# Patient Record
Sex: FEMALE | Race: BLACK OR AFRICAN AMERICAN | HISPANIC OR LATINO | URBAN - METROPOLITAN AREA
[De-identification: names, ages, dates, MRNs, and addresses within clinical notes are randomized per-mention and may not be internally consistent; named-entity substitution may affect disease eponyms.]

---

## 2022-09-29 ENCOUNTER — EMERGENCY (EMERGENCY)
Facility: HOSPITAL | Age: 47
LOS: 1 days | Discharge: HOME | End: 2022-09-29
Attending: EMERGENCY MEDICINE
Payer: MEDICAID

## 2022-09-29 VITALS
OXYGEN SATURATION: 98 % | WEIGHT: 130.07 LBS | TEMPERATURE: 98 F | SYSTOLIC BLOOD PRESSURE: 192 MMHG | HEART RATE: 87 BPM | DIASTOLIC BLOOD PRESSURE: 105 MMHG | RESPIRATION RATE: 20 BRPM

## 2022-09-29 VITALS — SYSTOLIC BLOOD PRESSURE: 180 MMHG | DIASTOLIC BLOOD PRESSURE: 116 MMHG

## 2022-09-29 PROCEDURE — 99283 EMERGENCY DEPT VISIT LOW MDM: CPT

## 2022-09-29 RX ORDER — FLUCONAZOLE 150 MG/1
150 TABLET ORAL ONCE
Refills: 0 | Status: COMPLETED | OUTPATIENT
Start: 2022-09-29 | End: 2022-09-29

## 2022-09-29 RX ADMIN — FLUCONAZOLE 150 MILLIGRAM(S): 150 TABLET ORAL at 23:54

## 2022-09-29 NOTE — ED PROVIDER NOTE - PATIENT PORTAL LINK FT
You can access the FollowMyHealth Patient Portal offered by [facility] by registering at the following website: [email or web address]. By joining [facility]’s FollowMyHealth portal, you will also be able to view your health information using other applications (apps) compatible with our system.

## 2022-09-29 NOTE — ED ADULT TRIAGE NOTE - CHIEF COMPLAINT QUOTE
Pt reporting vaginal pain/discharge, dysuria and back pain worsening x2 weeks. Pt now reports dizziness, tactile fevers and nausea, denies vomiting. Pt PMH HTN, does not take prescribed medication, denies chest pain/SOB. Pt with no apparent acute distress observed at this time.

## 2022-09-29 NOTE — ED PROVIDER NOTE - PHYSICAL EXAMINATION
Gen: Well appearing in NAD  Head: NC/AT  Neck: trachea midline  Cardiac: RRR  Resp:  No distress; CTAB  Abd: Soft, NT, no CVAT  : Thick white discharge, no tenderness (Chaperone Katya White, Scribe)  Ext: no deformities  Neuro:  A&O appears non focal  Skin:  Warm and dry as visualized  Psych:  Normal affect and mood Gen: Well appearing in NAD  Head: NC/AT  Neck: trachea midline  Cardiac: RRR  Resp:  No distress; CTAB  Abd: Soft, NT, no CVAT  : Thick white cervical discharge, no tenderness (Chaperone Katya White, Scribe)  Ext: no deformities  Neuro:  A&O appears non focal  Skin:  Warm and dry as visualized  Psych:  Normal affect and mood

## 2022-09-29 NOTE — ED PROVIDER NOTE - NSFOLLOWUPINSTRUCTIONS_ED_ALL_ED_FT
- Follow up with your doctor within 2-3 days.   - Bring results with you to the appointment.   - Take Amlodipine once per day.   - Return to the ED for any new or worsening symptoms.         LO QUE NECESITA SABER:    ¿Qué es la candidiasis?La infección por hongos, o candidiasis vaginal, chery infección vaginal común. La infección por hongos es causada por un hongo o microorganismo levaduriforme. Los hongos se encuentran normalmente en la vagina. Muchos hongos pueden causar chery infección.    ¿Qué aumenta mi riesgo de candidiasis?  •Embarazo      •Los medicamentos, jim los antibióticos, píldoras anticonceptivas o medicamentos con esteroides      •Condiciones médicas, jim la diabetes      •Dispositivos anticonceptivos, jim los diafragmas, las esponjas y los dispositivos intrauterinos      ¿Cuáles son los signos y síntomas de la candidiasis?  •Flujo espeso, olsen y con apariencia de queso que proviene de la vagina      •Picazón, inflamación o enrojecimiento en la vagina      •Dolor o ardor al orinar      •Dolor woody las relaciones sexuales      ¿Cómo se diagnostica y trata la candidiasis?  •Clark médico le preguntará acerca de clark historial médico y lo examinará. Chery muestra del flujo vaginal puede mostrar que levadura le está causando la infección.      •Los medicamentos ayudan a tratar la infección por el hongo y a disminuir la inflamación. El medicamento puede venir en presentación de píldora, crema, ungüento, comprimido o supositorio para la vagina. Con tratamiento, la infección usualmente desaparece en chery semana.      ¿Qué puedo hacer para mantener la kali de mi vagina?  •Limpie alrededor del área genital con agua tibia y un jabón suave todos los días.No coloque jabón dentro de la vagina. Después de lavada, séquela suavemente. No use jacuzzis. El calor y la humedad de los jacuzzis pueden aumentar el riesgo de otra candidiasis.      •Límpiese siempre de adelante hacia atrásdespués de usar el inodoro. [place] kezia la diseminación de bacterias desde el área rectal hacia la vagina.      •No use ropa ni lencería apretadadurante largos períodos. Que use ropa interior de algodón woody el día. El algodón ayuda a mantener el área genital seca y no mantiene el calor o la humedad. No use ninguna ropa interior por las noches.      •No tome duchas vaginalesni use aerosoles de higiene femenina o annette de burbujas. No utilice almohadillas o tampones que son perfumados o de colores ni papel higiénico perfumado.      •No tenga relaciones sexuales hasta que shelby síntomas hayan desaparecido.Radhames que clark valeriano use un preservativo hasta que usted complete el tratamiento.      •Consulte con clark médico acerca de las opciones de anticonceptivos, si es necesario.Los condones de látex y los diafragmas tienen un gel que enriquez los espermatozoides. Ambos pueden irritar el área genital.      ¿Cuándo anahi llamar a mi médico o ginecólogo?  •Usted tiene fiebre y escalofríos.      •Usted desarrolla un dolor abdominal o pélvico.      •La secreción contiene mitchel y no es de la menstruación.      •Shelby signos y síntomas empeoran, incluso después del tratamiento.      •Usted tiene preguntas o inquietudes acerca de clark condición o cuidado.      ACUERDOS SOBRE CLARK CUIDADO:    Usted tiene el derecho de ayudar a planear clark cuidado. Aprenda todo lo que pueda sobre clark condición y jim darle tratamiento. Discuta shelby opciones de tratamiento con shelby médicos para decidir el cuidado que usted desea recibir. Usted siempre tiene el derecho de rechazar el tratamiento.

## 2022-09-30 LAB
APPEARANCE UR: CLEAR — SIGNIFICANT CHANGE UP
BACTERIA # UR AUTO: ABNORMAL
BILIRUB UR-MCNC: NEGATIVE — SIGNIFICANT CHANGE UP
COLOR SPEC: YELLOW — SIGNIFICANT CHANGE UP
DIFF PNL FLD: ABNORMAL
EPI CELLS # UR: SIGNIFICANT CHANGE UP
GLUCOSE UR QL: NEGATIVE MG/DL — SIGNIFICANT CHANGE UP
KETONES UR-MCNC: NEGATIVE — SIGNIFICANT CHANGE UP
LEUKOCYTE ESTERASE UR-ACNC: NEGATIVE — SIGNIFICANT CHANGE UP
NITRITE UR-MCNC: NEGATIVE — SIGNIFICANT CHANGE UP
PH UR: 7 — SIGNIFICANT CHANGE UP (ref 5–8)
PROT UR-MCNC: 15
RBC CASTS # UR COMP ASSIST: SIGNIFICANT CHANGE UP /HPF (ref 0–4)
SP GR SPEC: 1 — LOW (ref 1.01–1.02)
UROBILINOGEN FLD QL: NEGATIVE MG/DL — SIGNIFICANT CHANGE UP
WBC UR QL: SIGNIFICANT CHANGE UP /HPF (ref 0–5)

## 2022-09-30 PROCEDURE — 81001 URINALYSIS AUTO W/SCOPE: CPT

## 2022-09-30 PROCEDURE — 87086 URINE CULTURE/COLONY COUNT: CPT

## 2022-09-30 PROCEDURE — 99283 EMERGENCY DEPT VISIT LOW MDM: CPT

## 2022-09-30 RX ORDER — AMLODIPINE BESYLATE 2.5 MG/1
5 TABLET ORAL ONCE
Refills: 0 | Status: COMPLETED | OUTPATIENT
Start: 2022-09-30 | End: 2022-09-30

## 2022-09-30 RX ORDER — AMLODIPINE BESYLATE 2.5 MG/1
1 TABLET ORAL
Qty: 14 | Refills: 0
Start: 2022-09-30 | End: 2022-10-13

## 2022-09-30 RX ADMIN — AMLODIPINE BESYLATE 5 MILLIGRAM(S): 2.5 TABLET ORAL at 00:38

## 2022-10-01 LAB
CULTURE RESULTS: SIGNIFICANT CHANGE UP
SPECIMEN SOURCE: SIGNIFICANT CHANGE UP

## 2022-11-12 ENCOUNTER — EMERGENCY (EMERGENCY)
Facility: HOSPITAL | Age: 47
LOS: 1 days | Discharge: HOME | End: 2022-11-12
Attending: EMERGENCY MEDICINE
Payer: MEDICAID

## 2022-11-12 VITALS
SYSTOLIC BLOOD PRESSURE: 162 MMHG | HEART RATE: 81 BPM | TEMPERATURE: 98 F | OXYGEN SATURATION: 98 % | WEIGHT: 149.91 LBS | DIASTOLIC BLOOD PRESSURE: 105 MMHG | RESPIRATION RATE: 16 BRPM

## 2022-11-12 VITALS
DIASTOLIC BLOOD PRESSURE: 100 MMHG | HEART RATE: 69 BPM | RESPIRATION RATE: 17 BRPM | SYSTOLIC BLOOD PRESSURE: 163 MMHG | OXYGEN SATURATION: 98 % | TEMPERATURE: 98 F

## 2022-11-12 PROCEDURE — 99283 EMERGENCY DEPT VISIT LOW MDM: CPT

## 2022-11-12 RX ORDER — IBUPROFEN 200 MG
600 TABLET ORAL ONCE
Refills: 0 | Status: COMPLETED | OUTPATIENT
Start: 2022-11-12 | End: 2022-11-12

## 2022-11-12 RX ORDER — ACETAMINOPHEN 500 MG
650 TABLET ORAL ONCE
Refills: 0 | Status: COMPLETED | OUTPATIENT
Start: 2022-11-12 | End: 2022-11-12

## 2022-11-12 RX ADMIN — Medication 650 MILLIGRAM(S): at 02:58

## 2022-11-12 RX ADMIN — Medication 600 MILLIGRAM(S): at 02:58

## 2022-11-12 NOTE — ED ADULT TRIAGE NOTE - CHIEF COMPLAINT QUOTE
Ambulatory to ED c/o headache and bilateral lower extremity swelling x3 days. HX HTN, states takes unknown medication daily for pressure control. At triage, GCS 15, no neuro deficits appreciated.

## 2022-11-12 NOTE — ED PROVIDER NOTE - OBJECTIVE STATEMENT
48 y/o female with PMHx of HTN presents to ED c/o intermittent HA for a couple of days, exact amount unknown. Pt did not take any pain medication. Denies n/v, visual changes. Pt also c/o bilateral foot pain and swelling for 3-4 days. Pt cannot recall which medicine she takes for HTN, but reports dose is 5mg. LMP-last month, states there is no chance of pregnancy due to tubal ligation.  : [id number]

## 2022-11-12 NOTE — ED PROVIDER NOTE - PATIENT PORTAL LINK FT
[FreeTextEntry2] : mild fatigue but still working a .  [de-identified] : Neuropathy UE You can access the FollowMyHealth Patient Portal offered by [facility] by registering at the following website: [email or web address]. By joining [facility]’s FollowMyHealth portal, you will also be able to view your health information using other applications (apps) compatible with our system.

## 2022-11-12 NOTE — ED PROVIDER NOTE - NSFOLLOWUPINSTRUCTIONS_ED_ALL_ED_FT
Cefalea tensional en los adultos    Tension Headache, Adult      La cefalea tensional es chery sensación de dolor o presión en la frente y los lados de la maddy. El dolor puede ser sordo o puede sentirse jim chery tensión. Hay dos tipos de cefaleas tensionales:  •Cefalea tensional episódica. Es cuando las cefaleas se producen menos de 15 días por mes.      •Cefalea tensional crónica. Es cuando las cefaleas se producen más de 15 días por mes en un período de 3 meses.      Las cefaleas tensionales pueden durar de 30 minutos a varios días. Constituyen el tipo más frecuente de dolor de maddy. Generalmente, no se asocian con náuseas o vómitos y no empeoran con la actividad física.      ¿Cuáles son las causas?    Se desconoce la causa exacta de esta afección. Las cefaleas tensionales generalmente aparecen después de chery situación de estrés, ansiedad o por depresión. Otros factores desencadenantes pueden incluir los siguientes:  •Alcohol.      •Exceso de cafeína o abstinencia de cafeína.      •Infecciones respiratorias, jim resfriados, gripes o sinusitis.      •Problemas dentales o apretar los dientes.      •Fatiga.      •Mantener la maddy y el lana en la misma posición woody un período prolongado, por ejemplo, al usar la computadora.      •Fumar.      •Artritis del lana.        ¿Cuáles son los signos o los síntomas?    Los síntomas de esta afección incluyen:  •Sensación de presión o tensión alrededor de la maddy.      •Dolor sordo en la maddy.      •Dolor sobre la frente y los lados de la maddy.      •Dolor a la palpación en los músculos de la maddy, del lana y de los hombros.        ¿Cómo se diagnostica?    Esta afección se puede diagnosticar en función de los síntomas, la historia clínica y los antecedentes médicos.    Si los síntomas son agudos o inusuales, es posible que le wilmer estudios de diagnóstico por imágenes, jim chery exploración por tomografía computarizada (TC) o chery resonancia magnética (RM) de la maddy. También le pueden revisar la vista.      ¿Cómo se trata?    Esta afección puede tratarse con cambios en el estilo de taylor y medicamentos que ayudan a aliviar los síntomas.      Siga estas instrucciones en douglass casa:    Control del dolor     •Use los medicamentos de venta charles y los recetados solamente jim se lo haya indicado el médico.      •Cuando tenga chery cefalea, acuéstese en chery habitación oscura y silenciosa.    •Si se lo indican, aplíquese hielo en la maddy y en el lana. Para hacer esto:  •Ponga el hielo en chery bolsa plástica.      •Coloque chery toalla entre la piel y la bolsa.      •Aplique el hielo woody 20 minutos, 2 o 3 veces por día.      •Retire el hielo si la piel se pone de color mcrae brillante. Lexa es muy importante. Si no puede sentir dolor, calor o frío, tiene un mayor riesgo de que se dañe la renata.      •Si se lo indican, aplique calor en la renata posterior del lana con la frecuencia que le haya indicado el médico. Use la mauri de calor que el médico le recomiende, jim chery compresa de calor húmedo o chery almohadilla térmica.  •Coloque chery toalla entre la piel y la mauri de calor.      •Aplique calor woody 20 a 30 minutos.      •Retire la mauri de calor si la piel se pone de color mcrae brillante. Lexa es especialmente importante si no puede sentir dolor, calor o frío. Corre un mayor riesgo de sufrir quemaduras.        Comida y bebida     •Mantenga un horario para las comidas.    •Si david alcohol:•Limite la cantidad que david a lo siguiente:  •De 0 a 1 medida por día para las mujeres que no están embarazadas.      •De 0 a 2 medidas por día para los hombres.        •Sepa cuánta cantidad de alcohol hay en las bebidas que prudence. En los [place], chery medida equivale a chery botella de cerveza de 12 oz (355 ml), un vaso de vino de 5 oz (148 ml) o un vaso de chery bebida alcohólica de brian graduación de 1½ oz (44 ml).        •Leeanna suficiente líquido jim para mantener la orina de color amarillo pálido.      •Disminuya el consumo de cafeína o deje de consumir cafeína.      Estilo de taylor     •Duerma entre 7 y 9 horas o la cantidad de horas que le haya recomendado el médico.      •A la hora de acostarse, retire las computadoras, los teléfonos y las tabletas del dormitorio.    •Busque maneras de manejar el estrés. Lexa puede incluir:  •Realizar actividad física.      •Practicar ejercicios de respiración profunda.      •Yoga.      •Escuchar música.      •Visualización mental positiva.        •Trate de sentarse derecho y evite tensionar los músculos.      • No consuma ningún producto que contenga nicotina o tabaco. Estos incluyen cigarrillos, tabaco para mascar y aparatos de vapeo, jim los cigarrillos electrónicos. Si necesita ayuda para dejar de fumar, consulte al médico.        Instrucciones generales    •Evite cualquier desencadenante del dolor de maddy. Lleve un registro diario para averiguar qué puede desencadenar las cefaleas. Registre, por ejemplo, lo siguiente:  •Lo que usted come y david.      •El tiempo que duerme.      •Algún cambio en douglass dieta o en los medicamentos.        •Cumpla con todas las visitas de seguimiento. Lexa es importante.        Comuníquese con un médico si:    •La cefalea no se basim.      •La cefalea regresa.      •Tiene sensibilidad a los sonidos, la dipak o los olores debido a la cefalea.      •Tiene náuseas o vómitos.      •Le duele el estómago.        Solicite ayuda de inmediato si:  •Tiene chery cefalea muy intensa y repentina junto con alguno de los siguientes síntomas:  •Rigidez en el lana.      •Náuseas y vómitos.      •Confusión.      •Debilidad en chery parte o un lado del cuerpo.      •Visión doble o pérdida de la visión.      •Falta de aire.      •Erupción cutánea.      •Somnolencia inusual.      •Fiebre o escalofríos.      •Dificultad para hablar.      •Dolor en el alma o el oído.      •Dificultad para caminar o mantener el equilibrio.      •Mareo o desmayo.          Resumen    •La cefalea tensional es chery sensación de dolor o presión en la frente y los lados de la maddy.      •Las cefaleas tensionales pueden durar de 30 minutos a varios días. Constituyen el tipo más frecuente de dolor de maddy.      •Esta afección se puede diagnosticar en función de los síntomas, la historia clínica y los antecedentes médicos.      •Esta afección puede tratarse con cambios en el estilo de taylor y medicamentos que ayudan a aliviar los síntomas.      Esta información no tiene jim fin reemplazar el consejo del médico. Asegúrese de hacerle al médico cualquier pregunta que tenga.

## 2022-11-12 NOTE — ED PROVIDER NOTE - CLINICAL SUMMARY MEDICAL DECISION MAKING FREE TEXT BOX
Pt reports intermittent HA, no other neurologic findings, takes BP medicine but cannot recall name. Pt also c/o several days LE swelling with no SOB or chest pain. Treat symptomatically for HA, suggesting followup with PMD.

## 2022-11-12 NOTE — ED PROVIDER NOTE - MUSCULOSKELETAL, MLM
Spine appears normal, range of motion is not limited, no muscle or joint tenderness 1+ pulses on bilateral LE.

## 2022-11-12 NOTE — ED ADULT NURSE NOTE - OBJECTIVE STATEMENT
Pt. c/o HA and bilateral lower extremity swelling x3 days.  Hx. of HTN, states taking meds.  spouse bedside

## 2024-01-14 NOTE — ED PROVIDER NOTE - OBJECTIVE STATEMENT
48 y/o female with no PMHx presents to ED c/o urinary symptoms. As per patient's daughter at bedside who translated for patient, patient reports 2 weeks of vaginal itching and back pain, was given Bactrim 2 days ago by her PMD with no relief. Endorsing burning with urination, yellowish discharge, and subjective fever    Denies smoking, drinking, drug use, hx of STD's, allergies 48 y/o female with no PMHx presents to ED c/o urinary symptoms. As per patient's daughter at bedside who translated for patient, patient reports 2 weeks of vaginal itching and back pain, was given Bactrim 2 days ago by her PMD with no relief. Endorsing burning with urination, yellowish discharge, and subjective fever.    Denies smoking, drinking, drug use, hematuria, hx of STD's, allergies    Offered  services, patient prefers family to translate. Low Risk (score 7-11)

## 2025-02-08 ENCOUNTER — EMERGENCY (EMERGENCY)
Facility: HOSPITAL | Age: 50
LOS: 1 days | Discharge: HOME | End: 2025-02-08
Attending: STUDENT IN AN ORGANIZED HEALTH CARE EDUCATION/TRAINING PROGRAM
Payer: MEDICAID

## 2025-02-08 VITALS
HEART RATE: 78 BPM | SYSTOLIC BLOOD PRESSURE: 154 MMHG | TEMPERATURE: 98 F | OXYGEN SATURATION: 96 % | DIASTOLIC BLOOD PRESSURE: 83 MMHG | RESPIRATION RATE: 19 BRPM

## 2025-02-08 VITALS
HEART RATE: 83 BPM | OXYGEN SATURATION: 98 % | DIASTOLIC BLOOD PRESSURE: 137 MMHG | TEMPERATURE: 97 F | RESPIRATION RATE: 18 BRPM | SYSTOLIC BLOOD PRESSURE: 198 MMHG

## 2025-02-08 PROBLEM — I10 ESSENTIAL (PRIMARY) HYPERTENSION: Chronic | Status: ACTIVE | Noted: 2022-11-12

## 2025-02-08 LAB
ALBUMIN SERPL ELPH-MCNC: 3.8 G/DL — SIGNIFICANT CHANGE UP (ref 3.3–5.2)
ALP SERPL-CCNC: 107 U/L — SIGNIFICANT CHANGE UP (ref 40–120)
ALT FLD-CCNC: 15 U/L — SIGNIFICANT CHANGE UP
ANION GAP SERPL CALC-SCNC: 13 MMOL/L — SIGNIFICANT CHANGE UP (ref 5–17)
AST SERPL-CCNC: 18 U/L — SIGNIFICANT CHANGE UP
BASOPHILS # BLD AUTO: 0.06 K/UL — SIGNIFICANT CHANGE UP (ref 0–0.2)
BASOPHILS NFR BLD AUTO: 0.8 % — SIGNIFICANT CHANGE UP (ref 0–2)
BILIRUB SERPL-MCNC: <0.2 MG/DL — LOW (ref 0.4–2)
BUN SERPL-MCNC: 14.7 MG/DL — SIGNIFICANT CHANGE UP (ref 8–20)
CALCIUM SERPL-MCNC: 9 MG/DL — SIGNIFICANT CHANGE UP (ref 8.4–10.5)
CHLORIDE SERPL-SCNC: 103 MMOL/L — SIGNIFICANT CHANGE UP (ref 96–108)
CO2 SERPL-SCNC: 24 MMOL/L — SIGNIFICANT CHANGE UP (ref 22–29)
CREAT SERPL-MCNC: 0.88 MG/DL — SIGNIFICANT CHANGE UP (ref 0.5–1.3)
EGFR: 82 ML/MIN/1.73M2 — SIGNIFICANT CHANGE UP
EOSINOPHIL # BLD AUTO: 0.16 K/UL — SIGNIFICANT CHANGE UP (ref 0–0.5)
EOSINOPHIL NFR BLD AUTO: 2 % — SIGNIFICANT CHANGE UP (ref 0–6)
GLUCOSE SERPL-MCNC: 112 MG/DL — HIGH (ref 70–99)
HCT VFR BLD CALC: 37.9 % — SIGNIFICANT CHANGE UP (ref 34.5–45)
HGB BLD-MCNC: 12 G/DL — SIGNIFICANT CHANGE UP (ref 11.5–15.5)
IMM GRANULOCYTES NFR BLD AUTO: 0.3 % — SIGNIFICANT CHANGE UP (ref 0–0.9)
LYMPHOCYTES # BLD AUTO: 2.54 K/UL — SIGNIFICANT CHANGE UP (ref 1–3.3)
LYMPHOCYTES # BLD AUTO: 31.8 % — SIGNIFICANT CHANGE UP (ref 13–44)
MCHC RBC-ENTMCNC: 24.9 PG — LOW (ref 27–34)
MCHC RBC-ENTMCNC: 31.7 G/DL — LOW (ref 32–36)
MCV RBC AUTO: 78.8 FL — LOW (ref 80–100)
MONOCYTES # BLD AUTO: 0.43 K/UL — SIGNIFICANT CHANGE UP (ref 0–0.9)
MONOCYTES NFR BLD AUTO: 5.4 % — SIGNIFICANT CHANGE UP (ref 2–14)
NEUTROPHILS # BLD AUTO: 4.78 K/UL — SIGNIFICANT CHANGE UP (ref 1.8–7.4)
NEUTROPHILS NFR BLD AUTO: 59.7 % — SIGNIFICANT CHANGE UP (ref 43–77)
PLATELET # BLD AUTO: 230 K/UL — SIGNIFICANT CHANGE UP (ref 150–400)
POTASSIUM SERPL-MCNC: 4.2 MMOL/L — SIGNIFICANT CHANGE UP (ref 3.5–5.3)
POTASSIUM SERPL-SCNC: 4.2 MMOL/L — SIGNIFICANT CHANGE UP (ref 3.5–5.3)
PROT SERPL-MCNC: 7.5 G/DL — SIGNIFICANT CHANGE UP (ref 6.6–8.7)
RBC # BLD: 4.81 M/UL — SIGNIFICANT CHANGE UP (ref 3.8–5.2)
RBC # FLD: 16.5 % — HIGH (ref 10.3–14.5)
SODIUM SERPL-SCNC: 140 MMOL/L — SIGNIFICANT CHANGE UP (ref 135–145)
WBC # BLD: 7.99 K/UL — SIGNIFICANT CHANGE UP (ref 3.8–10.5)
WBC # FLD AUTO: 7.99 K/UL — SIGNIFICANT CHANGE UP (ref 3.8–10.5)

## 2025-02-08 PROCEDURE — 36415 COLL VENOUS BLD VENIPUNCTURE: CPT

## 2025-02-08 PROCEDURE — 96375 TX/PRO/DX INJ NEW DRUG ADDON: CPT

## 2025-02-08 PROCEDURE — 99284 EMERGENCY DEPT VISIT MOD MDM: CPT

## 2025-02-08 PROCEDURE — 99284 EMERGENCY DEPT VISIT MOD MDM: CPT | Mod: 25

## 2025-02-08 PROCEDURE — 80053 COMPREHEN METABOLIC PANEL: CPT

## 2025-02-08 PROCEDURE — 85025 COMPLETE CBC W/AUTO DIFF WBC: CPT

## 2025-02-08 PROCEDURE — 96374 THER/PROPH/DIAG INJ IV PUSH: CPT

## 2025-02-08 PROCEDURE — 93005 ELECTROCARDIOGRAM TRACING: CPT

## 2025-02-08 PROCEDURE — T1013: CPT

## 2025-02-08 PROCEDURE — 93010 ELECTROCARDIOGRAM REPORT: CPT

## 2025-02-08 RX ORDER — METOCLOPRAMIDE 10 MG/1
10 TABLET ORAL ONCE
Refills: 0 | Status: COMPLETED | OUTPATIENT
Start: 2025-02-08 | End: 2025-02-08

## 2025-02-08 RX ORDER — AMLODIPINE BESYLATE 5 MG
10 TABLET ORAL ONCE
Refills: 0 | Status: COMPLETED | OUTPATIENT
Start: 2025-02-08 | End: 2025-02-08

## 2025-02-08 RX ORDER — KETOROLAC TROMETHAMINE 10 MG
15 TABLET ORAL ONCE
Refills: 0 | Status: DISCONTINUED | OUTPATIENT
Start: 2025-02-08 | End: 2025-02-08

## 2025-02-08 RX ORDER — BACTERIOSTATIC SODIUM CHLORIDE 0.9 %
1000 VIAL (ML) INJECTION ONCE
Refills: 0 | Status: COMPLETED | OUTPATIENT
Start: 2025-02-08 | End: 2025-02-08

## 2025-02-08 RX ADMIN — Medication 10 MILLIGRAM(S): at 04:06

## 2025-02-08 RX ADMIN — Medication 1000 MILLILITER(S): at 04:05

## 2025-02-08 RX ADMIN — METOCLOPRAMIDE 10 MILLIGRAM(S): 10 TABLET ORAL at 04:05

## 2025-02-08 RX ADMIN — Medication 15 MILLIGRAM(S): at 04:05

## 2025-02-08 NOTE — ED PROVIDER NOTE - PATIENT PORTAL LINK FT
You can access the FollowMyHealth Patient Portal offered by [facility] by registering at the following website: [email or web address]. By joining [facility]’s FollowMyHealth portal, you will also be able to view your health information using other applications (apps) compatible with our system.

## 2025-02-08 NOTE — ED PROVIDER NOTE - ATTENDING CONTRIBUTION TO CARE
50 yo female with hx of htn presents for evaluation of generalized gradual onset headache with elevated blood pressure. Patient states he was recently started on amlodipine. Today her headache began and she noted significantly elevated blood pressure prompting presentation.     CONSTITUTIONAL: In no apparent distress.  HEENMT: Airway patent, normal appearing mouth, nose, throat, neck supple with full range of motion, no cervical adenopathy.  EYES: Pupils equal, round and reactive to light, Extra-ocular movement intact, eyes are clear b/l  CARDIAC: Regular rate and rhythm, Heart sounds S1 S2 present  RESPIRATORY: No respiratory distress. No stridor, Lungs sounds clear with good aeration bilaterally.   GASTROINTESTINAL: Abdomen soft, non-tender and non-distended, no rebound, no guarding and no masses.   MUSCULOSKELETAL: Spine appears normal, movement of extremities grossly intact.  NEUROLOGICAL: Alert and interactive, no focal deficits, tone is normal, moving all extremities well,  SKIN: No cyanosis, no pallor, no jaundice, no rash    IRosario, personally saw the patient with the resident, and completed the key components of the history and physical exam. I then discussed the management plan with the resident.    : Lawrence

## 2025-02-08 NOTE — ED ADULT TRIAGE NOTE - CHIEF COMPLAINT QUOTE
PT stated that she has been experiencing a headache starting this morning.  PT also stated that her BP has been high.  PT had a hx of HTN

## 2025-02-08 NOTE — ED PROVIDER NOTE - PHYSICAL EXAMINATION
General: well appearing, NAD  Head: NC, AT  EENT: PERRLA, EOMI, no scleral icterus  Cardiac: RRR, no apparent murmurs, no lower extremity edema  Respiratory: CTABL, no respiratory distress   Abdomen: soft, ND, NT, nonperitonitic  MSK/Vascular: full ROM, soft compartments, warm extremities  Neuro: AAOx3, no focal deficits, sensation to light touch intact, gait intact  Psych: calm, cooperative

## 2025-02-08 NOTE — ED PROVIDER NOTE - NSFOLLOWUPINSTRUCTIONS_ED_ALL_ED_FT
1) Follow up with your doctor in 1-2 weeks  2) Return to the ER for worsening or concerning symptoms  3)Take ibuprofen and acetaminophen for pain control  4) Make sure to follow-up with your primary care doctor with regards to re-evaluation of your blood pressure      1) Sharmila un seguimiento con douglass médico en 1-2 semanas  2) Regresar a la jasen de emergencias si los síntomas empeoran o son preocupantes.  3)Eagan ibuprofeno y acetaminofén para controlar el dolor.  4) Asegúrese de hacer un seguimiento con douglass médico de atención primaria con respecto a la reevaluación de douglass presión arterial.      Hipertensión en los adultos  Hypertension, Adult    La presión arterial brian (hipertensión) se produce cuando la fuerza de la mitchel bombea a través de las arterias con mucha fuerza. Las arterias son los vasos sanguíneos que transportan la mitchel desde el corazón al mino del cuerpo. La hipertensión hace que el corazón sharmila más esfuerzo para bombear mitchel y puede provocar que las arterias se estrechen o endurezcan. La hipertensión no tratada o no controlada puede causar infarto de miocardio, insuficiencia cardíaca, accidente cerebrovascular, enfermedad renal y otros problemas.    Chery lectura de la presión arterial consta de un número más alto sobre un número más bajo. En condiciones ideales, la presión arterial debe estar por debajo de 120/80. El primer número (“superior”) es la presión sistólica. Es la medida de la presión de las arterias cuando el corazón late. El serena número (“inferior”) es la presión diastólica. Es la medida de la presión en las arterias cuando el corazón se relaja.    ¿Cuáles son las causas?  Se desconoce la causa exacta de esta afección. Hay algunas afecciones que causan presión arterial brian o están relacionadas con naga.    ¿Qué incrementa el riesgo?  Algunos factores de riesgo de hipertensión están bajo douglass control. Los siguientes factores pueden hacer que sea más propenso a desarrollar esta afección:    Fumar.  Tener diabetes mellitus tipo 2, colesterol alto, o ambos.  No hacer la cantidad suficiente de actividad física o ejercicio.  Tener sobrepeso.  Consumir mucha grasa, azúcar, calorías o sal (sodio) en douglass dieta.  Beber alcohol en exceso.    Algunos factores de riesgo para la presión arterial brian pueden ser difíciles o imposibles de cambiar. Algunos de estos factores son los siguientes:    Tener enfermedad renal crónica.  Tener antecedentes familiares de presión arterial brian.  Edad. Los riesgos aumentan con la edad.  Johny. El riesgo es mayor para las personas afroamericanas.  Sexo. Antes de los 45 años, los hombres corren más riesgo que las mujeres. Después de los 65 años, las mujeres corren más riesgo que los hombres.  Tener apnea obstructiva del sueño.  Estrés.    ¿Cuáles son los signos o los síntomas?  Es posible que la presión arterial brian puede no cause síntomas. La presión arterial muy brian (crisis hipertensiva) puede provocar:    Dolor de maddy.  Ansiedad.  Falta de aire.  Hemorragia nasal.  Náuseas y vómitos.  Cambios en la visión.  Dolor de pecho intenso.  Convulsiones.    ¿Cómo se diagnostica?  Esta afección se diagnostica al medir douglass presión arterial mientras se encuentra sentado, con el brazo apoyado sobre chery superficie plana, las piernas sin cruzar y los pies bart apoyados en el piso. El brazalete del tensiómetro debe colocarse directamente sobre la piel de la parte superior del brazo y al nivel de douglass corazón. Debe medirla al menos dos veces en el mismo brazo. Determinadas condiciones pueden causar chery diferencia de presión arterial entre el brazo kaity y el derecho.    Ciertos factores pueden provocar que las lecturas de la presión arterial chele inferiores o superiores a lo normal por un período corto de tiempo:    Si douglass presión arterial es más brian cuando se encuentra en el consultorio del médico que cuando la mide en douglass hogar, se denomina “hipertensión de bata jaime”. La mayoría de las personas que tienen esta afección no deben ser medicadas.  Si douglass presión arterial es más brian en el hogar que cuando se encuentra en el consultorio del médico, se denomina “hipertensión enmascarada”. La mayoría de las personas que tienen esta afección deben ser medicadas para controlar la presión arterial.    Si tiene chery lecturas de presión arterial brian woody chery visita o si tiene presión arterial normal con otros factores de riesgo, se le podrá pedir que sharmila lo siguiente:    Que regrese otro día para volver a controlar douglass presión arterial nuevamente.  Que se controle la presión arterial en douglass casa woody 1 semana o más.    Si se le diagnostica hipertensión, es posible que se le realicen otros análisis de mitchel o estudios de diagnóstico por imágenes para ayudar a douglass médico a comprender douglass riesgo general de tener otras afecciones.    ¿Cómo se trata?  Esta afección se trata haciendo cambios saludables en el estilo de taylor, tales jim ingerir alimentos saludables, realizar más ejercicio y reducir el consumo de alcohol. El médico puede recetarle medicamentos si los cambios en el estilo de taylor no son suficientes para lograr controlar la presión arterial y si:    Douglass presión arterial sistólica está por encima de 130.  Douglass presión arterial diastólica está por encima de 80.    La presión arterial deseada puede variar en función de las enfermedades, la edad y otros factores personales.    Siga estas instrucciones en douglass casa:      Comida y bebida     Siga chery dieta con alto contenido de fibras y potasio, y con bajo contenido de sodio, azúcar agregada y grasas. Un ejemplo de plan alimenticio es la dieta DASH (Dietary Approaches to Stop Hypertension, Métodos alimenticios para detener la hipertensión). Para alimentarse de esta manera:    Coma mucha fruta y verdura fresca. Trate de que la mitad del plato de cada comida sea de frutas y verduras.  Coma cereales integrales, jim pasta integral, arroz integral o pan integral. Llene aproximadamente un cuarto del plato con cereales integrales.  Coma y danyel productos lácteos con bajo contenido de grasa, jim leche descremada o yogur bajo en grasas.  Evite la ingesta de carbajal de carne grasa, carne procesada o curada, y carne de ave con piel. Llene aproximadamente un cuarto del plato con proteínas magras, jim pescado, alexey sin piel, frijoles, huevos o tofu.  Evite ingerir alimentos prehechos y procesados. En general, estos tienen mayor cantidad de sodio, azúcar agregada y grasa.  Reduzca douglass ingesta diaria de sodio. La mayoría de las personas que tienen hipertensión deben comer menos de 1500 mg de sodio por día.  No danyel alcohol si:    Douglass médico le indica no hacerlo.  Está embarazada, puede estar embarazada o está tratando de quedar embarazada.  Si david alcohol:    Limite la cantidad que david a lo siguiente:    De 0 a 1 medida por día para las mujeres.  De 0 a 2 medidas por día para los hombres.  Esté atento a la cantidad de alcohol que hay en las bebidas que prudence. En los [place], chery medida equivale a chery botella de cerveza de 12 oz (355 ml), un vaso de vino de 5 oz (148 ml) o un vaso de chery bebida alcohólica de brian graduación de 1½ oz (44 ml).        Estilo de taylor     Trabaje con douglass médico para mantener un peso saludable o perder peso. Pregúntele cuál es el peso recomendado para usted.  Sharmila al menos 30 minutos de ejercicio la mayoría de los días de la semana. Estas actividades pueden incluir caminar, nadar o andar en bicicleta.  Incluya ejercicios para fortalecer mishel músculos (ejercicios de resistencia), jim [place] o levantamiento de pesas, jim parte de douglass rutina semanal de ejercicios. Intente realizar 30 minutos de luís tipo de ejercicios al menos daksha días a la semana.  No consuma ningún producto que contenga nicotina o tabaco, jim cigarrillos, cigarrillos electrónicos y tabaco de mascar. Si necesita ayuda para dejar de fumar, consulte al médico.  Contrólese la presión arterial en douglass casa según las indicaciones del médico.  Concurra a todas las visitas de seguimiento jim se lo haya indicado el médico. [facility] es importante.        Medicamentos    Eagan los medicamentos de venta charles y los recetados solamente jim se lo haya indicado el médico. Siga cuidadosamente las indicaciones. Los medicamentos para la presión arterial deben tomarse según las indicaciones.  No omita las dosis de medicamentos para la presión arterial. Si lo hace, estará en riesgo de tener problemas y puede hacer que los medicamentos chele menos eficaces.  Pregúntele a douglass médico a qué efectos secundarios o reacciones a los medicamentos debe prestar atención.    Comuníquese con un médico si:  Piensa que tiene chery reacción a un medicamento que está tomando.  Tiene gulshan de maddy frecuentes (recurrentes).  Se siente mareado.  Tiene hinchazón en los tobillos.  Tiene problemas de visión.    Solicite ayuda inmediatamente si:  Siente un dolor de maddy intenso o confusión.  Siente debilidad inusual o adormecimiento.  Siente que va a desmayarse.  Siente un dolor intenso en el pecho o el abdomen.  Vomita repetidas veces.  Tiene dificultad para respirar.    Resumen  La hipertensión se produce cuando la mitchel bombea en las arterias con mucha fuerza. Si esta afección no se controla, podría correr riesgo de tener complicaciones graves.  La presión arterial deseada puede variar en función de las enfermedades, la edad y otros factores personales. Para la mayoría de las personas, chery presión arterial normal es elsa que 120/80.  La hipertensión se trata con cambios en el estilo de taylor, medicamentos o chery combinación de ambos. Los cambios en el estilo de taylor incluyen pérdida de peso, ingerir alimentos sanos, seguir chery dieta baja en sodio, hacer más ejercicio y limitar el consumo de alcohol.    NOTAS ADICIONALES E INSTRUCCIONES    Please follow up with your Primary MD in 24-48 hr.  Seek immediate medical care for any new/worsening signs or symptoms.          Dolor de maddy general sin causa  General Headache Without Cause    El dolor de maddy es un dolor o malestar que se siente en la renata de la maddy o del lana. Puede no tener chery causa específica. Hay muchas causas y tipos de gulshan de maddy. Los gulshan de maddy más comunes son los siguientes:    Cefaleas tensionales.  Cefaleas migrañosas.  Cefalea en brotes.  Cefaleas diarias crónicas.    Siga estas indicaciones en douglass casa:  Controle douglass afección para detectar cualquier cambio. Informe a douglass médico acerca de cualquier cambio. Siga estos pasos para controlar douglass afección:        Control del dolor      Eagan los medicamentos de venta charles y los recetados solamente jim se lo haya indicado el médico.  Cuando sienta dolor de maddy acuéstese en un cuarto oscuro y tranquilo.  Si se lo indican, aplíquese hielo en la maddy y en la renata del lana:    Ponga el hielo en chery bolsa plástica.  Coloque chery toalla entre la piel y la bolsa.  Coloque el hielo woody 20 minutos, 2 a 3 veces al día.  Si se lo indican, aplique calor en la renata afectada. Use la mauri de calor que el médico le recomiende, jim chery compresa de calor húmedo o chery almohadilla térmica.    Coloque chery toalla entre la piel y la mauri de calor.  Aplique calor woody 20 a 30 minutos.  Retire la mauri de calor si la piel se pone de color mcrae brillante. [facility] es especialmente importante si no puede sentir dolor, calor o frío. Puede correr un riesgo mayor de sufrir quemaduras.  Mantenga las luces tenues si las luces brillantes le molestan o mishel gulshan de maddy empeoran.        Comida y bebida    Mantenga un horario para las comidas.  Si david alcohol:    Limite la cantidad que david a lo siguiente:     De 0 a 1 medida por día para las mujeres.   De 0 a 2 medidas por día para los hombres.   Esté atento a la cantidad de alcohol que hay en las bebidas que prudence. En los [place], chery medida equivale a chery botella de cerveza de 12 oz (355 ml), un vaso de vino de 5 oz (148 ml) o un vaso de chery bebida alcohólica de brian graduación de 1½ oz (44 ml).  Deje de ria cafeína o disminuya la cantidad que consume.        Indicaciones generales     Lleve un diario de los gulshan de maddy para averiguar qué factores pueden desencadenarlos. Registre, por ejemplo, lo siguiente:    Lo que usted come y david.  El tiempo que duerme.  Algún cambio en douglass dieta o en los medicamentos.  Pruebe algunas técnicas de relajación, jim los masajes.  Limite el estrés.  Siéntese con la espalda recta y no tense los músculos.  No consuma ningún producto que contenga nicotina o tabaco, jim cigarrillos, cigarrillos electrónicos y tabaco de mascar. Si necesita ayuda para dejar de consumir, consulte al médico.  Sharmila actividad física habitualmente jim se lo haya indicado el médico.  Tenga un horario fijo para dormir. Duerma entre 7 y 9 horas todas las noches o la cantidad de horas que le haya recomendado el médico.  Concurra a todas las visitas de control jim se lo haya indicado el médico. [facility] es importante.    Comuníquese con un médico si:  Los medicamentos no logran aliviar los síntomas.  Tiene un dolor de maddy que es diferente del dolor de maddy habitual.  Tiene náuseas o vómitos.  Tiene fiebre.    Solicite ayuda inmediatamente si:  El dolor se vuelve intenso rápidamente.  El dolor empeora después de hacer actividad física moderada o intensa.  Ha vomitado repetidas veces.  Presenta rigidez en el lana.  Sufre pérdida de la visión.  Tiene problemas para hablar.  Siente dolor en el alma o en el oído.  Presenta debilidad muscular o pérdida del control muscular.  Pierde el equilibrio o tiene problemas para caminar.  Sufre mareos o se desmaya.  Experimenta confusión.  Tiene chery convulsión.    Resumen  El dolor de maddy es un dolor o malestar que se siente en la renata de la maddy o del lana.  Hay muchas causas y tipos de gulshan de maddy. En algunos casos, es posible que no se encuentre la causa.  Lleve un diario de los gulshan de maddy para averiguar qué factores pueden desencadenarlos. Controle douglass afección para detectar cualquier cambio. Informe a douglass médico acerca de cualquier cambio.  Comuníquese con un médico si tiene un dolor de maddy que es diferente de lo habitual o si los síntomas no se alivian con los medicamentos.  Solicite ayuda de inmediato si el dolor se vuelve intenso, vomita, tiene pérdida de la visión, pierde el equilibrio o tiene chery convulsión.    NOTAS ADICIONALES E INSTRUCCIONES    Please follow up with your Primary MD in 24-48 hr.  Seek immediate medical care for any new/worsening signs or symptoms.

## 2025-02-08 NOTE — ED ADULT NURSE NOTE - OBJECTIVE STATEMENT
patient complaining of headache since yesterday morning, pt with hx of high BP and reports high BP reading at home. patient on BP medications

## 2025-02-08 NOTE — ED PROVIDER NOTE - OBJECTIVE STATEMENT
49-year-old female recently diagnosed with hypertension presenting to the ER with right sided headaches starting earlier this morning.  Patient was started on 10 mg amlodipine, did not help her blood pressure.  She did not take any medicine prior to arrival.  Denies vision changes, difficulty walking, chest pain, shortness of breath, back pain, abdominal pain, urinary changes.